# Patient Record
Sex: FEMALE | Race: WHITE | Employment: STUDENT | URBAN - METROPOLITAN AREA
[De-identification: names, ages, dates, MRNs, and addresses within clinical notes are randomized per-mention and may not be internally consistent; named-entity substitution may affect disease eponyms.]

---
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
## 2020-03-06 ENCOUNTER — HOSPITAL ENCOUNTER (OUTPATIENT)
Age: 20
Discharge: HOME | End: 2020-03-06
Attending: EMERGENCY MEDICINE
Payer: COMMERCIAL

## 2020-03-06 VITALS
BODY MASS INDEX: 25.61 KG/M2 | TEMPERATURE: 98 F | DIASTOLIC BLOOD PRESSURE: 65 MMHG | HEART RATE: 101 BPM | HEIGHT: 64 IN | SYSTOLIC BLOOD PRESSURE: 115 MMHG | OXYGEN SATURATION: 98 % | RESPIRATION RATE: 18 BRPM | WEIGHT: 150 LBS

## 2020-03-06 DIAGNOSIS — B27.90 INFECTIOUS MONONUCLEOSIS WITHOUT COMPLICATION, INFECTIOUS MONONUCLEOSIS DUE TO UNSPECIFIED ORGANISM: Primary | ICD-10-CM

## 2020-03-06 LAB
POCT MONO: POSITIVE
S PYO AG THROAT QL: NEGATIVE

## 2020-03-06 PROCEDURE — 99213 OFFICE O/P EST LOW 20 MIN: CPT

## 2020-03-06 PROCEDURE — 86308 HETEROPHILE ANTIBODY SCREEN: CPT | Performed by: EMERGENCY MEDICINE

## 2020-03-06 PROCEDURE — 87430 STREP A AG IA: CPT

## 2020-03-06 NOTE — ED PROVIDER NOTES
Patient Seen in: [facility] In [address] [place] [address]    History   Patient presents with:  Sore Throat    Stated Complaint: Sore Throat/Face Swelling    HPI    Patient here complaining of sore throat for 2 WEEKS.   Notes pain is described as SORE uvula midline, no pointing, no stridor  LUNGS:  CTAB no resp distress, lungs clear bilateral  SKIN: good skin turgor, no obvious rashes  NECK: supple, no meningeal signs, B/L TEDNER ANT.  CERVICAL lymphadenopathy  CARDIO: Regular without murmur  EXTREMITIES

## 2020-03-06 NOTE — ED INITIAL ASSESSMENT (HPI)
Patient reports she has had a sore throat for about 2 weeks and it went away. Patient reports last night the sore throat started again.

## 2020-12-04 ENCOUNTER — TELEPHONE (OUTPATIENT)
Dept: FAMILY MEDICINE CLINIC | Facility: CLINIC | Age: 20
End: 2020-12-04

## 2020-12-04 NOTE — TELEPHONE ENCOUNTER
Pre-op forms received from [facility]. DOS 12/28/20. Pt scheduled for pre-op appt on 12/8/20. Forms placed on pending folder.

## 2020-12-08 ENCOUNTER — OFFICE VISIT (OUTPATIENT)
Dept: FAMILY MEDICINE CLINIC | Facility: CLINIC | Age: 20
End: 2020-12-08
Payer: COMMERCIAL

## 2020-12-08 ENCOUNTER — LAB ENCOUNTER (OUTPATIENT)
Dept: LAB | Age: 20
End: 2020-12-08
Attending: NURSE PRACTITIONER
Payer: COMMERCIAL

## 2020-12-08 VITALS
HEIGHT: 64 IN | SYSTOLIC BLOOD PRESSURE: 107 MMHG | DIASTOLIC BLOOD PRESSURE: 71 MMHG | WEIGHT: 154 LBS | BODY MASS INDEX: 26.29 KG/M2 | HEART RATE: 80 BPM

## 2020-12-08 DIAGNOSIS — Z01.818 PRE-OP EXAMINATION: ICD-10-CM

## 2020-12-08 DIAGNOSIS — Z01.818 PRE-OP EXAMINATION: Primary | ICD-10-CM

## 2020-12-08 DIAGNOSIS — N62 LARGE BREASTS: ICD-10-CM

## 2020-12-08 PROCEDURE — 93010 ELECTROCARDIOGRAM REPORT: CPT | Performed by: NURSE PRACTITIONER

## 2020-12-08 PROCEDURE — 85025 COMPLETE CBC W/AUTO DIFF WBC: CPT

## 2020-12-08 PROCEDURE — 93005 ELECTROCARDIOGRAM TRACING: CPT

## 2020-12-08 PROCEDURE — 80053 COMPREHEN METABOLIC PANEL: CPT

## 2020-12-08 PROCEDURE — 36415 COLL VENOUS BLD VENIPUNCTURE: CPT

## 2020-12-08 PROCEDURE — 3078F DIAST BP <80 MM HG: CPT | Performed by: NURSE PRACTITIONER

## 2020-12-08 PROCEDURE — 85730 THROMBOPLASTIN TIME PARTIAL: CPT

## 2020-12-08 PROCEDURE — 3074F SYST BP LT 130 MM HG: CPT | Performed by: NURSE PRACTITIONER

## 2020-12-08 PROCEDURE — 3008F BODY MASS INDEX DOCD: CPT | Performed by: NURSE PRACTITIONER

## 2020-12-08 PROCEDURE — 99214 OFFICE O/P EST MOD 30 MIN: CPT | Performed by: NURSE PRACTITIONER

## 2020-12-08 PROCEDURE — 85610 PROTHROMBIN TIME: CPT

## 2020-12-08 RX ORDER — NORETHINDRONE ACETATE AND ETHINYL ESTRADIOL AND FERROUS FUMARATE 1.5-30(21)
1 KIT ORAL DAILY
COMMUNITY
Start: 2020-11-13

## 2020-12-08 NOTE — PATIENT INSTRUCTIONS
No aspirin, ibuprofen or naproxen starting 12/14/2020-acetaminophen only. Please let me know if you have any questions or concerns.

## 2020-12-08 NOTE — PROGRESS NOTES
HPI  Pthere for pre-op-having breast reduction done 12/28/20. Dr Fabián Canas at [facility].     No acute complaints at this time. Has chronic upper back pain. No known problems w anesthesia.    Tonsillectomy in past.     Review of Systems   Constitutional Marital status: Single      Spouse name: Not on file      Number of children: Not on file      Years of education: Not on file      Highest education level: Not on file    Occupational History      Not on file    Social Needs      Financial resource strain atraumatic. Right Ear: Tympanic membrane and ear canal normal. No cerumen present  Left Ear: Tympanic membrane and ear canal normal. No cerumen present  Mouth/Throat: No posterior oropharyngeal erythema.    Left tonsil is enlarged 1+, no erythema   Eyes:

## 2020-12-21 ENCOUNTER — TELEPHONE (OUTPATIENT)
Dept: FAMILY MEDICINE CLINIC | Facility: CLINIC | Age: 20
End: 2020-12-21

## 2020-12-21 NOTE — TELEPHONE ENCOUNTER
Pre-op for surgery on 12/28/20 completed and reviewed by RACHAEL Ochoa. and Dr. Wanda Fierro    Pt is cleared for surgery     H&P, labs and EKG    Faxed to reconstructive [facility] Pl[facility] surgery    confirmation received    Fax [phone]   Tel. [phone]

## 2022-08-01 ENCOUNTER — OFFICE VISIT (OUTPATIENT)
Dept: OBGYN CLINIC | Facility: CLINIC | Age: 22
End: 2022-08-01
Payer: COMMERCIAL

## 2022-08-01 VITALS — BODY MASS INDEX: 24 KG/M2 | SYSTOLIC BLOOD PRESSURE: 111 MMHG | DIASTOLIC BLOOD PRESSURE: 74 MMHG | WEIGHT: 140 LBS

## 2022-08-01 DIAGNOSIS — Z11.3 SCREEN FOR SEXUALLY TRANSMITTED DISEASES: ICD-10-CM

## 2022-08-01 DIAGNOSIS — Z01.419 ENCOUNTER FOR WELL WOMAN EXAM WITH ROUTINE GYNECOLOGICAL EXAM: Primary | ICD-10-CM

## 2022-08-01 DIAGNOSIS — Z12.4 PAPANICOLAOU SMEAR FOR CERVICAL CANCER SCREENING: ICD-10-CM

## 2022-08-01 DIAGNOSIS — Z30.41 ENCOUNTER FOR BIRTH CONTROL PILLS MAINTENANCE: ICD-10-CM

## 2022-08-01 PROCEDURE — 99385 PREV VISIT NEW AGE 18-39: CPT | Performed by: NURSE PRACTITIONER

## 2022-08-01 PROCEDURE — 3074F SYST BP LT 130 MM HG: CPT | Performed by: NURSE PRACTITIONER

## 2022-08-01 PROCEDURE — 3078F DIAST BP <80 MM HG: CPT | Performed by: NURSE PRACTITIONER

## 2022-08-02 LAB
C TRACH DNA SPEC QL NAA+PROBE: NEGATIVE
N GONORRHOEA DNA SPEC QL NAA+PROBE: NEGATIVE

## 2022-08-03 ENCOUNTER — TELEPHONE (OUTPATIENT)
Dept: OBGYN CLINIC | Facility: CLINIC | Age: 22
End: 2022-08-03

## 2022-08-03 DIAGNOSIS — Z30.41 ENCOUNTER FOR BIRTH CONTROL PILLS MAINTENANCE: Primary | ICD-10-CM

## 2022-08-03 RX ORDER — NORGESTREL AND ETHINYL ESTRADIOL 0.3-0.03MG
1 KIT ORAL DAILY
COMMUNITY
Start: 2022-05-28 | End: 2022-08-03

## 2022-08-03 RX ORDER — NORGESTREL AND ETHINYL ESTRADIOL 0.3-0.03MG
1 KIT ORAL DAILY
Qty: 84 TABLET | Refills: 3 | Status: SHIPPED | OUTPATIENT
Start: 2022-08-03

## 2022-08-03 NOTE — TELEPHONE ENCOUNTER
Patient calling with information regarding current OCP she is taking and needs refill on. Rx pulled into chart via reconcilliation and pended for provider review/consideration.

## 2022-08-03 NOTE — TELEPHONE ENCOUNTER
Patient calling to inform that the birth control she takes has an [facility] of [phone]. She does not know the dosage. Please advise.

## 2023-05-01 ENCOUNTER — TELEPHONE (OUTPATIENT)
Dept: OBGYN CLINIC | Facility: CLINIC | Age: 23
End: 2023-05-01

## 2023-05-01 DIAGNOSIS — Z30.41 ENCOUNTER FOR BIRTH CONTROL PILLS MAINTENANCE: ICD-10-CM

## 2023-05-01 RX ORDER — NORGESTREL AND ETHINYL ESTRADIOL 0.3-0.03MG
1 KIT ORAL DAILY
Qty: 84 TABLET | Refills: 0 | Status: SHIPPED | OUTPATIENT
Start: 2023-05-01

## 2023-05-01 NOTE — TELEPHONE ENCOUNTER
Patient name and  verified. Patient requesting refill for OCP. States she is currently away at school. OCP sent to patients preferred pharmacy.

## 2023-05-01 NOTE — TELEPHONE ENCOUNTER
Patient does not take the sugar pill each month with her birth control. Therefore she is currently out. Due for an annual in August, previously saw Zulema Courtney. Hoping to get a refill. Please advise.

## 2023-07-10 ENCOUNTER — OFFICE VISIT (OUTPATIENT)
Facility: CLINIC | Age: 23
End: 2023-07-10
Payer: COMMERCIAL

## 2023-07-10 VITALS
BODY MASS INDEX: 24.52 KG/M2 | HEIGHT: 64 IN | HEART RATE: 66 BPM | WEIGHT: 143.63 LBS | SYSTOLIC BLOOD PRESSURE: 104 MMHG | DIASTOLIC BLOOD PRESSURE: 68 MMHG

## 2023-07-10 DIAGNOSIS — Z30.41 ENCOUNTER FOR BIRTH CONTROL PILLS MAINTENANCE: ICD-10-CM

## 2023-07-10 DIAGNOSIS — Z01.419 ENCOUNTER FOR ANNUAL ROUTINE GYNECOLOGICAL EXAMINATION: Primary | ICD-10-CM

## 2023-07-10 DIAGNOSIS — Z11.3 ROUTINE SCREENING FOR STI (SEXUALLY TRANSMITTED INFECTION): ICD-10-CM

## 2023-07-10 PROCEDURE — 87491 CHLMYD TRACH DNA AMP PROBE: CPT | Performed by: STUDENT IN AN ORGANIZED HEALTH CARE EDUCATION/TRAINING PROGRAM

## 2023-07-10 PROCEDURE — 87591 N.GONORRHOEAE DNA AMP PROB: CPT | Performed by: STUDENT IN AN ORGANIZED HEALTH CARE EDUCATION/TRAINING PROGRAM

## 2023-07-10 RX ORDER — NORGESTREL AND ETHINYL ESTRADIOL 0.3-0.03MG
1 KIT ORAL DAILY
Qty: 84 TABLET | Refills: 4 | Status: SHIPPED | OUTPATIENT
Start: 2023-07-10

## 2023-07-11 LAB
C TRACH DNA SPEC QL NAA+PROBE: NEGATIVE
N GONORRHOEA DNA SPEC QL NAA+PROBE: NEGATIVE

## 2025-06-21 ENCOUNTER — HOSPITAL ENCOUNTER (OUTPATIENT)
Age: 25
Discharge: HOME | End: 2025-06-21
Payer: COMMERCIAL

## 2025-06-21 VITALS
TEMPERATURE: 98 F | RESPIRATION RATE: 18 BRPM | HEART RATE: 65 BPM | SYSTOLIC BLOOD PRESSURE: 104 MMHG | DIASTOLIC BLOOD PRESSURE: 56 MMHG | OXYGEN SATURATION: 99 %

## 2025-06-21 DIAGNOSIS — K59.00 CONSTIPATION, UNSPECIFIED CONSTIPATION TYPE: ICD-10-CM

## 2025-06-21 DIAGNOSIS — R10.32 LLQ PAIN: Primary | ICD-10-CM

## 2025-06-21 LAB
B-HCG UR QL: NEGATIVE
BILIRUB UR QL STRIP: NEGATIVE
CLARITY UR: CLEAR
COLOR UR: YELLOW
GLUCOSE UR STRIP-MCNC: NEGATIVE MG/DL
HGB UR QL STRIP: NEGATIVE
KETONES UR STRIP-MCNC: NEGATIVE MG/DL
LEUKOCYTE ESTERASE UR QL STRIP: NEGATIVE
NITRITE UR QL STRIP: NEGATIVE
PH UR STRIP: 7 [PH]
PROT UR STRIP-MCNC: NEGATIVE MG/DL
SP GR UR STRIP: 1.02
UROBILINOGEN UR STRIP-ACNC: <2 MG/DL

## 2025-06-21 PROCEDURE — 99213 OFFICE O/P EST LOW 20 MIN: CPT | Performed by: PHYSICIAN ASSISTANT

## 2025-06-21 PROCEDURE — 81025 URINE PREGNANCY TEST: CPT | Performed by: PHYSICIAN ASSISTANT

## 2025-06-21 PROCEDURE — 81002 URINALYSIS NONAUTO W/O SCOPE: CPT | Performed by: PHYSICIAN ASSISTANT

## 2025-06-21 NOTE — ED INITIAL ASSESSMENT (HPI)
LLQ pain, pelvic pain constipation x2 days. Pt can also feel \"lump\" in LLQ when she lays down.

## 2025-06-21 NOTE — ED PROVIDER NOTES
Patient Seen in: [facility]        History  Chief Complaint   Patient presents with    Abdominal Pain     Stated Complaint: abdominal pain    Subjective:   HPI            Patient is a 24-year-old female without chronic medical problems who presents to the immediate care for evaluation of constipation and left lower quadrant pain x 2 days.  She rates the pain 4 out of 10 in severity and feels it is a \"fullness\".  She states that the pain appears to be worse when she eats.  No fevers or chills.  She reports only being able to pass a small bowel movement that she describes as bile-like in appearance.  No fevers or chills.  No history of GI disorders or family history of IBD.  She has not tried any medications yet.  LMP was about 3 weeks ago.  She denies any vaginal discharge or concerns for STIs.  No vomiting or suspicious food intake.  No hematochezia or melena.  No dysuria or urgency or frequency.  She reports a fullness to her left lower quadrant as well, but no bulges or bulging with straining or lifting.      Objective:     History reviewed. No pertinent past medical history.           Past Surgical History:   Procedure Laterality Date    Adenoidectomy      Tonsillectomy                  Social History     Socioeconomic History    Marital status: Single   Tobacco Use    Smoking status: Never    Smokeless tobacco: Never   Substance and Sexual Activity    Alcohol use: Not Currently              Review of Systems   Constitutional:  Negative for fever.   Respiratory:  Negative for shortness of breath.    Cardiovascular:  Negative for chest pain.   Gastrointestinal:  Positive for abdominal pain and constipation. Negative for anal bleeding, blood in stool, diarrhea and vomiting.   Genitourinary:  Negative for dysuria, frequency, urgency, vaginal bleeding and vaginal discharge.       Positive for stated complaint: abdominal pain  Other systems are as noted in HPI.  Constitutional and vital signs reviewed.       All other systems reviewed and negative except as noted above.                  Physical Exam    ED Triage Vitals [06/21/25 1147]   /56   Pulse 65   Resp 18   Temp 98.2 °F (36.8 °C)   Temp src Oral   SpO2 99 %   O2 Device None (Room air)       Current Vitals:   Vital Signs  BP: 104/56  Pulse: 65  Resp: 18  Temp: 98.2 °F (36.8 °C)  Temp src: Oral    Oxygen Therapy  SpO2: 99 %  O2 Device: None (Room air)            Physical Exam  Vitals and nursing note reviewed.   Constitutional:       General: She is not in acute distress.     Appearance: Normal appearance. She is not ill-appearing.   HENT:      Head: Normocephalic and atraumatic.      Right Ear: External ear normal.      Left Ear: External ear normal.      Nose: Nose normal.      Mouth/Throat:      Mouth: Mucous membranes are moist.      Pharynx: Oropharynx is clear.   Eyes:      General: No scleral icterus.     Extraocular Movements: Extraocular movements intact.      Conjunctiva/sclera: Conjunctivae normal.      Pupils: Pupils are equal, round, and reactive to light.   Cardiovascular:      Rate and Rhythm: Normal rate and regular rhythm.      Heart sounds: Normal heart sounds. No murmur heard.     No friction rub. No gallop.   Pulmonary:      Effort: Pulmonary effort is normal. No respiratory distress.      Breath sounds: Normal breath sounds. No stridor. No wheezing, rhonchi or rales.   Abdominal:      General: Abdomen is flat. Bowel sounds are normal. There is no distension.      Palpations: Abdomen is soft. There is no mass.      Tenderness: There is abdominal tenderness in the left lower quadrant. There is no right CVA tenderness, left CVA tenderness, guarding or rebound.      Hernia: No hernia is present.      Comments: Minimal left lower quadrant tenderness.  No guarding or rebound.   Genitourinary:     Comments: Deferred.  Musculoskeletal:      Cervical back: Normal range of motion and neck supple.   Skin:     General: Skin is warm and dry.    Neurological:      Mental Status: She is alert.                 ED Course  Labs Reviewed   POCT PREGNANCY URINE - Normal   EM POCT URINALYSIS DIPSTICK                            MDM  Patient is a 24-year-old female without chronic medical problems who presents to the immediate care for evaluation of left lower quadrant pain associated with constipation x 2 days.  Patient provides a history.  She presents to immediate care well-appearing with grossly normal vital signs.  Physical exam shows some minimal left lower quadrant tenderness but no peritoneal signs.  Urinalysis was unremarkable and hCG negative.  Discussed with patient that abdominal pain can have multiple causes and her pain could be due to constipation although other possible causes include diverticulitis, pancreatitis, acute abdomen.  Less likely UTI, ovarian torsion, PID or other causes.  Offered labs and CT imaging for further evaluation but she declines at this time as she feels that her pain could be due to constipation.  She will try over-the-counter MiraLAX as directed.  She can also take Tylenol or Advil as needed for pain.  Recommend she follows up with her PCP within the next couple of days if symptoms or not improving.  Return precautions also discussed as well as ER precautions.  Patient expressed understanding and agreement with plan.  All questions answered.  Patient was discussed with supervising physician, Dr. Huang Morales.        Medical Decision Making      Disposition and Plan     Clinical Impression:  1. LLQ pain    2. Constipation, unspecified constipation type         Disposition:  Discharge  6/21/2025 12:16 pm    Follow-up:  Nonstaff, Physician  [address] [facility] [address]    In 1 day            Medications Prescribed:  Current Discharge Medication List                Supplementary Documentation:

## (undated) NOTE — LETTER
10/17/2018          To Whom It May Concern:    Phyllis Matias is currently under my medical care. Please excuse Hoa for 11 days.     Activity is restricted as follows:Pt may not participate in gym class at this time.   Pt may not participate in any swim

## (undated) NOTE — LETTER
10/17/2018          To Whom It May Concern:    Roland Drake is currently under my medical care and may not return to school at this time. Please excuse Hoa for 11 days.     Activity is restricted as follows:Pt may not participate in gym class at this t

## (undated) NOTE — LETTER
11/14/2018          To Whom It May Concern:    John Fuentes is currently under my medical care. Nhung Fall may return to all activities including physical ed classes and extracurricular activities without restrictions. Activity is restricted as follows: none. If you require additional information please contact our office.         Sincerely,    RACHAEL Oquendo, NADYAP-C          Document generated by:  [employer]

## (undated) NOTE — LETTER
11/8/2018          To Whom It May Concern:    Jerrell Pennington is currently under my medical care. She may return to physical education and all extracurricular sports activity on 11/26/18  Activity is restricted as follows: none.     If you require additio

## (undated) NOTE — LETTER
Date: 9/15/2017    Patient Name: Jacqueline Pang          To Whom it may concern: This letter has been written at the patient's request. The above patient was seen at the [facility] for treatment of a medical condition.       The patient may

## (undated) NOTE — LETTER
Date & Time: 9/24/2018, 5:37 PM  Patient: Sam Steve  Encounter Provider(s):    Jacqueline Major MD       To Whom It May Concern:    Brent Mcclain was seen and treated in our department on 9/24/2018.  She should not participate in gym/sports until 9/
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
## (undated) NOTE — LETTER
Date & Time: 10/11/2018, 5:05 PM  Patient: Phyllis Matias  Encounter Provider(s):    RACHAEL Redman       To Whom It May Concern:    Clarisa Manning was seen and treated in our department on 10/11/2018.  She should not participate in gym/sports until f